# Patient Record
Sex: FEMALE | Race: WHITE | NOT HISPANIC OR LATINO | URBAN - NONMETROPOLITAN AREA
[De-identification: names, ages, dates, MRNs, and addresses within clinical notes are randomized per-mention and may not be internally consistent; named-entity substitution may affect disease eponyms.]

---

## 2019-12-09 ENCOUNTER — IMPORTED ENCOUNTER (OUTPATIENT)
Dept: URBAN - NONMETROPOLITAN AREA CLINIC 1 | Facility: CLINIC | Age: 69
End: 2019-12-09

## 2019-12-09 PROCEDURE — 92133 CPTRZD OPH DX IMG PST SGM ON: CPT

## 2019-12-09 PROCEDURE — 92014 COMPRE OPH EXAM EST PT 1/>: CPT

## 2019-12-09 NOTE — PATIENT DISCUSSION
Glaucoma Suspect-Based on disc assymetry-Appears stable at this time.-Continue to monitor with exams and testing. oct today/shyanneDM s -Stressed the importance of keeping blood sugars under control and regular visits with PCP. -Explained the possible effects of poorly controlled diabetes and the damage that diabetes can cause to ocular health. -Pt instructed to contact our office with any vision changes. Cataract OU-Not yet surgical. -Reviewed symptoms of advancing cataract growth such as glare and halos and decreased vision.-Continue to monitor for now.  Pt will notify us if any new symptoms develop.; Dr's Notes: send to both

## 2020-07-23 NOTE — PATIENT DISCUSSION
Patient occasionally wears CL's both for distance. States she can not read with her contact lenses and has not found the right power reader to wear over her CL.

## 2020-07-23 NOTE — PATIENT DISCUSSION
Recommended artificial tears to use as directed. Continue gel drops at bedtime. Adv to treat CELINA aggressively prior to A scan.

## 2020-07-23 NOTE — PATIENT DISCUSSION
Consider OS to follow. EYE OS, IOL TYPE MF Toric lens, POST OPERATIVE TARGET PL/-0.25, PACKAGE Advanced.

## 2020-07-23 NOTE — PATIENT DISCUSSION
Multifocal IOLs have an added po risk of increased glare or halos and there is no guarantee that the pt will not need glasses po or see 20/20. Advised that patient will need to wear mild readers for seeing fine print or reading in dimmer lighting.

## 2020-12-14 ENCOUNTER — IMPORTED ENCOUNTER (OUTPATIENT)
Dept: URBAN - NONMETROPOLITAN AREA CLINIC 1 | Facility: CLINIC | Age: 70
End: 2020-12-14

## 2020-12-14 PROCEDURE — 92014 COMPRE OPH EXAM EST PT 1/>: CPT

## 2020-12-14 PROCEDURE — 92133 CPTRZD OPH DX IMG PST SGM ON: CPT

## 2022-04-09 ASSESSMENT — TONOMETRY
OS_IOP_MMHG: 18
OD_IOP_MMHG: 18
OS_IOP_MMHG: 18
OD_IOP_MMHG: 18

## 2022-04-09 ASSESSMENT — VISUAL ACUITY
OS_CC: 20/40-2
OD_CC: 20/30-2
OD_CC: 20/25-2
OS_CC: 20/30-2

## 2022-08-16 ENCOUNTER — EMERGENCY VISIT (OUTPATIENT)
Dept: RURAL CLINIC 1 | Facility: CLINIC | Age: 72
End: 2022-08-16

## 2022-08-16 DIAGNOSIS — H00.15: ICD-10-CM

## 2022-08-16 PROCEDURE — 99213 OFFICE O/P EST LOW 20 MIN: CPT

## 2022-08-16 ASSESSMENT — VISUAL ACUITY
OS_SC: 20/50-2
OD_PH: 20/25-1
OU_SC: 20/100
OS_PH: 20/30-1
OD_SC: 20/50
OU_SC: 20/30-2

## 2022-08-16 ASSESSMENT — TONOMETRY
OD_IOP_MMHG: 18
OS_IOP_MMHG: 20

## 2022-08-16 NOTE — PATIENT DISCUSSION
Glaucoma Suspect-Based on disc assymetry-Appears stable at this time.-Continue to monitor with exams and testing. oct today/shyanneDM s -Stressed the importance of keeping blood sugars under control and regular visits with PCP. -Explained the possible effects of poorly controlled diabetes and the damage that diabetes can cause to ocular health. -Pt instructed to contact our office with any vision changes. Cataract OU-Not yet surgical. -Reviewed symptoms of advancing cataract growth such as glare and halos and decreased vision.-Continue to monitor for now. Pt will notify us if any new symptoms develop.; Dr's Notes: send to both.

## 2022-11-09 ENCOUNTER — ESTABLISHED PATIENT (OUTPATIENT)
Dept: RURAL CLINIC 1 | Facility: CLINIC | Age: 72
End: 2022-11-09

## 2022-11-09 DIAGNOSIS — H25.13: ICD-10-CM

## 2022-11-09 DIAGNOSIS — E11.9: ICD-10-CM

## 2022-11-09 DIAGNOSIS — H40.013: ICD-10-CM

## 2022-11-09 PROCEDURE — 92083 EXTENDED VISUAL FIELD XM: CPT

## 2022-11-09 PROCEDURE — 92014 COMPRE OPH EXAM EST PT 1/>: CPT

## 2022-11-09 ASSESSMENT — VISUAL ACUITY
OS_SC: 20/40-1
OU_SC: 20/30-1
OU_SC: 20/200
OD_SC: 20/40
OS_SC: 20/200
OD_SC: 20/200

## 2022-11-09 ASSESSMENT — TONOMETRY
OD_IOP_MMHG: 18
OS_IOP_MMHG: 18

## 2023-11-10 ENCOUNTER — ESTABLISHED PATIENT (OUTPATIENT)
Dept: RURAL CLINIC 1 | Facility: CLINIC | Age: 73
End: 2023-11-10

## 2023-11-10 DIAGNOSIS — H40.013: ICD-10-CM

## 2023-11-10 DIAGNOSIS — H25.13: ICD-10-CM

## 2023-11-10 DIAGNOSIS — E11.9: ICD-10-CM

## 2023-11-10 PROCEDURE — 92083 EXTENDED VISUAL FIELD XM: CPT

## 2023-11-10 PROCEDURE — 92014 COMPRE OPH EXAM EST PT 1/>: CPT

## 2023-11-10 ASSESSMENT — VISUAL ACUITY
OS_SC: 20/40
OU_SC: 20/25-2
OD_SC: 20/40-2

## 2023-11-10 ASSESSMENT — TONOMETRY
OS_IOP_MMHG: 18
OD_IOP_MMHG: 18

## 2024-11-11 ENCOUNTER — COMPREHENSIVE EXAM (OUTPATIENT)
Dept: RURAL CLINIC 1 | Facility: CLINIC | Age: 74
End: 2024-11-11

## 2024-11-11 DIAGNOSIS — H25.13: ICD-10-CM

## 2024-11-11 DIAGNOSIS — E11.9: ICD-10-CM

## 2024-11-11 DIAGNOSIS — H40.013: ICD-10-CM

## 2024-11-11 PROCEDURE — 92014 COMPRE OPH EXAM EST PT 1/>: CPT

## 2024-11-11 PROCEDURE — 92083 EXTENDED VISUAL FIELD XM: CPT
